# Patient Record
Sex: MALE | Race: WHITE | Employment: FULL TIME | URBAN - METROPOLITAN AREA
[De-identification: names, ages, dates, MRNs, and addresses within clinical notes are randomized per-mention and may not be internally consistent; named-entity substitution may affect disease eponyms.]

---

## 2019-07-09 ENCOUNTER — HOSPITAL ENCOUNTER (EMERGENCY)
Age: 24
Discharge: HOME | End: 2019-07-10
Attending: EMERGENCY MEDICINE

## 2019-07-09 ENCOUNTER — APPOINTMENT (OUTPATIENT)
Dept: CT IMAGING | Age: 24
End: 2019-07-09

## 2019-07-09 ENCOUNTER — APPOINTMENT (OUTPATIENT)
Dept: GENERAL RADIOLOGY | Age: 24
End: 2019-07-09

## 2019-07-09 VITALS
HEART RATE: 91 BPM | TEMPERATURE: 98.8 F | DIASTOLIC BLOOD PRESSURE: 80 MMHG | HEIGHT: 77 IN | BODY MASS INDEX: 22.43 KG/M2 | SYSTOLIC BLOOD PRESSURE: 136 MMHG | RESPIRATION RATE: 14 BRPM | WEIGHT: 190 LBS | OXYGEN SATURATION: 96 %

## 2019-07-09 DIAGNOSIS — S00.03XA CONTUSION OF SCALP, INITIAL ENCOUNTER: Primary | ICD-10-CM

## 2019-07-09 DIAGNOSIS — S80.12XA CONTUSION OF LEFT LOWER EXTREMITY, INITIAL ENCOUNTER: ICD-10-CM

## 2019-07-09 PROCEDURE — 70450 CT HEAD/BRAIN W/O DYE: CPT

## 2019-07-09 PROCEDURE — 73590 X-RAY EXAM OF LOWER LEG: CPT

## 2019-07-09 PROCEDURE — 73562 X-RAY EXAM OF KNEE 3: CPT

## 2019-07-09 PROCEDURE — 99284 EMERGENCY DEPT VISIT MOD MDM: CPT

## 2019-07-09 RX ORDER — IBUPROFEN 800 MG/1
800 TABLET ORAL EVERY 8 HOURS PRN
Qty: 20 TABLET | Refills: 0 | Status: SHIPPED | OUTPATIENT
Start: 2019-07-09 | End: 2019-07-28 | Stop reason: ALTCHOICE

## 2019-07-09 ASSESSMENT — PAIN DESCRIPTION - PAIN TYPE: TYPE: ACUTE PAIN

## 2019-07-09 ASSESSMENT — PAIN SCALES - GENERAL: PAINLEVEL_OUTOF10: 5

## 2019-07-09 ASSESSMENT — PAIN DESCRIPTION - LOCATION: LOCATION: HEAD

## 2019-07-10 NOTE — ED PROVIDER NOTES
Social History     Socioeconomic History    Marital status: Single     Spouse name: None    Number of children: None    Years of education: None    Highest education level: None   Occupational History    None   Social Needs    Financial resource strain: None    Food insecurity:     Worry: None     Inability: None    Transportation needs:     Medical: None     Non-medical: None   Tobacco Use    Smoking status: Current Every Day Smoker     Packs/day: 0.50    Smokeless tobacco: Never Used   Substance and Sexual Activity    Alcohol use: Yes    Drug use: Yes     Types: Marijuana    Sexual activity: None   Lifestyle    Physical activity:     Days per week: None     Minutes per session: None    Stress: None   Relationships    Social connections:     Talks on phone: None     Gets together: None     Attends Cheondoism service: None     Active member of club or organization: None     Attends meetings of clubs or organizations: None     Relationship status: None    Intimate partner violence:     Fear of current or ex partner: None     Emotionally abused: None     Physically abused: None     Forced sexual activity: None   Other Topics Concern    None   Social History Narrative    None       SCREENINGS    [place] Coma Scale  Eye Opening: Spontaneous  Best Verbal Response: Oriented  Best Motor Response: Obeys commands  Rissa Coma Scale Score: 15 @FLOW([id number])@      PHYSICAL EXAM    (up to 7 for level 4, 8 or more for level 5)     ED Triage Vitals [07/09/19 2246]   BP Temp Temp Source Pulse Resp SpO2 Height Weight   (!) 147/95 98.8 °F (37.1 °C) Oral 85 15 98 % 6' 5\" (1.956 m) 190 lb (86.2 kg)       Physical Exam      General Appearance:  Alert, cooperative, no distress, appears stated age. Head:  Normocephalic, without obviousabnormality, atraumatic. Some abrasions to the right side of the head   Eyes:  conjunctiva/corneas clear, EOM's intact. Sclera anicteric.    ENT: Mucous membranes moist.   Neck: Supple, symmetrical, trachea midline, no adenopathy. No jugular venous distention. Lungs:   Clear to auscultation bilaterally, respirationsunlabored. No rales, rhonchi or wheezes. Chest Wall:  No tenderness. Heart:  Regular rate and rhythm, S1 and S2 normal, no murmur, rub or gallop. Abdomen:   Soft, non-tender, bowel sounds active,   no masses, no organomegaly. Extremities: No edema, cords or calf tenderness. Full range of motion. Abrasion to the left leg full range of motion of the knee   Pulses: 2+ and symmetric   Skin: Turgor is normal, no rashes or lesions. Neurologic: Alert and oriented X 3. No focal findings. Motor grossly normal.  Speech clear, no drift, CN III-XII grossly intact,        DIAGNOSTIC RESULTS   LABS:    Labs Reviewed - No data to display    All other labs were within normal range or not returned as of this dictation. EKG: All EKG's are interpreted by the Emergency Department Physician who eithersigns or Co-signs this chart in the absence of a cardiologist.        RADIOLOGY:   Non-plain film images such as CT, Ultrasound and MRI are read by the radiologist. Plain radiographic images are visualized by myself. *    Interpretation per the Radiologist below, if available at the time of this note:    CT Head WO Contrast   Final Result   No acute intracranial abnormality. XR KNEE LEFT (3 VIEWS)   Final Result   No acute plain film abnormality identified. XR TIBIA FIBULA LEFT (2 VIEWS)   Final Result   No acute plain film abnormality identified.                PROCEDURES   Unless otherwise noted below, none     Procedures    *    CRITICAL CARE TIME   N/A      EMERGENCY DEPARTMENT COURSE and DIFFERENTIALDIAGNOSIS/MDM:   Vitals:    Vitals:    07/09/19 2246 07/09/19 2342   BP: (!) 147/95 136/80   Pulse: 85 91   Resp: 15 14   Temp: 98.8 °F (37.1 °C)    TempSrc: Oral    SpO2: 98% 96%   Weight: 190 lb (86.2 kg)    Height: 6' 5\" (1.956 m)        Patient was given

## 2019-07-10 NOTE — ED NOTES
Findings and plan of care discussed with patient at bedside by provider. Pt verbalizes understanding of instructions as discussed by provider. Written discharge instructions, rx x 1 and referral info provided, pt in agreement with plan of care, denies other needs at this time.      Florian Vasques RN  07/10/19 4408

## 2019-07-28 ENCOUNTER — HOSPITAL ENCOUNTER (EMERGENCY)
Age: 24
Discharge: HOME | End: 2019-07-28

## 2019-07-28 VITALS
WEIGHT: 190 LBS | BODY MASS INDEX: 22.43 KG/M2 | OXYGEN SATURATION: 98 % | DIASTOLIC BLOOD PRESSURE: 99 MMHG | HEIGHT: 77 IN | HEART RATE: 63 BPM | TEMPERATURE: 98.3 F | SYSTOLIC BLOOD PRESSURE: 128 MMHG | RESPIRATION RATE: 16 BRPM

## 2019-07-28 DIAGNOSIS — S61.213A LACERATION OF LEFT MIDDLE FINGER WITHOUT DAMAGE TO NAIL, FOREIGN BODY PRESENCE UNSPECIFIED, INITIAL ENCOUNTER: Primary | ICD-10-CM

## 2019-07-28 PROCEDURE — 6360000002 HC RX W HCPCS: Performed by: NURSE PRACTITIONER

## 2019-07-28 PROCEDURE — 99282 EMERGENCY DEPT VISIT SF MDM: CPT

## 2019-07-28 PROCEDURE — 90471 IMMUNIZATION ADMIN: CPT | Performed by: NURSE PRACTITIONER

## 2019-07-28 PROCEDURE — 90715 TDAP VACCINE 7 YRS/> IM: CPT | Performed by: NURSE PRACTITIONER

## 2019-07-28 RX ADMIN — TETANUS TOXOID, REDUCED DIPHTHERIA TOXOID AND ACELLULAR PERTUSSIS VACCINE, ADSORBED 0.5 ML: 5; 2.5; 8; 8; 2.5 SUSPENSION INTRAMUSCULAR at 19:00

## 2019-07-28 ASSESSMENT — ENCOUNTER SYMPTOMS
ABDOMINAL PAIN: 0
SHORTNESS OF BREATH: 0

## 2019-07-28 ASSESSMENT — PAIN DESCRIPTION - ORIENTATION: ORIENTATION: LEFT

## 2019-07-28 ASSESSMENT — PAIN DESCRIPTION - PAIN TYPE: TYPE: ACUTE PAIN

## 2019-07-28 ASSESSMENT — PAIN DESCRIPTION - LOCATION: LOCATION: FINGER (COMMENT WHICH ONE)

## 2019-07-28 ASSESSMENT — PAIN SCALES - GENERAL: PAINLEVEL_OUTOF10: 5

## 2019-07-28 NOTE — ED NOTES
Instructed to leave in place until tomorrow. Remove dsg daily and wash soap and water. Polysporin x 2 days then just cover with dry bandaid. Return for increased redness swelling drainage. Pt v/u. Polysporin, telfa and 2 in cotton roll applied. Tolerated well.       Jana Leigh RN  07/28/19 1922

## 2019-07-28 NOTE — ED PROVIDER NOTES
rate.   Pulmonary/Chest: Effort normal. No respiratory distress. Abdominal: Soft. He exhibits no distension. Musculoskeletal: Normal range of motion. He exhibits tenderness. He exhibits no edema or deformity. 1.5cm Superficial laceration noted to left middle finger at the distal phalanx on the dorsal side. Edges are well approximated bleeding is controlled. Patient has full range of motion is able to bend at the DIP and PIP joints. Neurological: He is alert and oriented to person, place, and time. Skin: Skin is warm and dry. Psychiatric: He has a normal mood and affect. DIAGNOSTIC RESULTS   LABS:    Labs Reviewed - No data to display    All other labs werewithin normal range or not returned as of this dictation. EKG: All EKG's are interpreted by the Emergency Department Physician who either signs or Co-signs this chart in the absence of acardiologist.  Please see their note for interpretation of EKG. RADIOLOGY:           Interpretation per the Radiologist below, if available at the time of this note:    No orders to display     No results found. PROCEDURES   Unless otherwise noted below, none     Procedures    CRITICAL CARE TIME   N/A    CONSULTS:  None      EMERGENCYDEPARTMENT COURSE and DIFFERENTIAL DIAGNOSIS/MDM:   Vitals:    Vitals:    07/28/19 1816   BP: (!) 128/99   Pulse: 63   Resp: 16   Temp: 98.3 °F (36.8 °C)   TempSrc: Oral   SpO2: 98%   Weight: 190 lb (86.2 kg)   Height: 6' 5\" (1.956 m)       Patient was given the following medications:  Medications   Tetanus-Diphth-Acell Pertussis (BOOSTRIX) injection 0.5 mL (has no administration in time range)       Patient was seen and evaluated by myself. Patient here for finger laceration that occurred today with a . On exam the patient is awake and alert hemodynamically stable nontoxic in appearance. He does have a superficial laceration which I do not feel needs any sutures at this time.   The edges are well approximated and bleeding is controlled. He did receive wound care here. He will be updated on his tetanus. He will be discharged home with instructions to follow-up and establish care with the PCP that was provided. He was encouraged to return to the ED for any worsening symptoms. Patient was discharged home with all questions answered. The patient tolerated their visit well. I have evaluated thispatient. My supervising physician was available for consultation. The patient and / or the family were informed of the results of any tests, a time was given to answer questions, a plan was proposed and they agreed Benny Díaz. FINAL IMPRESSION      1. Laceration of left middle finger without damage to nail, foreign body presence unspecified, initial encounter          DISPOSITION/PLAN   DISPOSITION        PATIENT REFERRED TO:  [facility]) Pre-Services  [phone]  Schedule an appointment as soon as possible for a visit in 1 week  to establish primary care    [place] [id number].  [facility] Emergency Department  [address] [place] [address]  [phone]    If symptoms worsen      DISCHARGE MEDICATIONS:  New Prescriptions    No medications on file       DISCONTINUED MEDICATIONS:  Discontinued Medications    IBUPROFEN (IBU) 800 MG TABLET    Take 1 tablet by mouth every 8 hours as needed for Pain              (Please note that portions of this note were completed with a voice recognition program.  Efforts were made to edit the dictations but occasionally words are mis-transcribed.)    MARIANA Prieto CNP (electronically signed)       MARIANA Prieto CNP  07/28/19 1678

## 2019-12-29 ENCOUNTER — HOSPITAL ENCOUNTER (EMERGENCY)
Age: 24
Discharge: HOME | End: 2019-12-29
Attending: EMERGENCY MEDICINE

## 2019-12-29 ENCOUNTER — APPOINTMENT (OUTPATIENT)
Dept: CT IMAGING | Age: 24
End: 2019-12-29

## 2019-12-29 ENCOUNTER — APPOINTMENT (OUTPATIENT)
Dept: GENERAL RADIOLOGY | Age: 24
End: 2019-12-29

## 2019-12-29 VITALS
DIASTOLIC BLOOD PRESSURE: 70 MMHG | OXYGEN SATURATION: 99 % | WEIGHT: 190 LBS | SYSTOLIC BLOOD PRESSURE: 124 MMHG | BODY MASS INDEX: 22.43 KG/M2 | TEMPERATURE: 98 F | HEART RATE: 64 BPM | RESPIRATION RATE: 16 BRPM | HEIGHT: 77 IN

## 2019-12-29 PROCEDURE — 99285 EMERGENCY DEPT VISIT HI MDM: CPT

## 2019-12-29 PROCEDURE — 71046 X-RAY EXAM CHEST 2 VIEWS: CPT

## 2019-12-29 PROCEDURE — 70450 CT HEAD/BRAIN W/O DYE: CPT

## 2019-12-29 ASSESSMENT — PAIN DESCRIPTION - DIRECTION: RADIATING_TOWARDS: INTO BACK

## 2019-12-29 ASSESSMENT — PAIN DESCRIPTION - DURATION: DURATION_2: INTERMITTENT

## 2019-12-29 ASSESSMENT — PAIN DESCRIPTION - DESCRIPTORS: DESCRIPTORS_2: PRESSURE

## 2019-12-29 ASSESSMENT — ENCOUNTER SYMPTOMS
BACK PAIN: 0
ABDOMINAL PAIN: 0
SHORTNESS OF BREATH: 0

## 2019-12-29 ASSESSMENT — PAIN DESCRIPTION - INTENSITY: RATING_2: 0

## 2019-12-29 ASSESSMENT — PAIN SCALES - GENERAL: PAINLEVEL_OUTOF10: 2

## 2019-12-29 ASSESSMENT — PAIN DESCRIPTION - ORIENTATION
ORIENTATION: LEFT
ORIENTATION_2: ANTERIOR;RIGHT

## 2019-12-29 ASSESSMENT — PAIN DESCRIPTION - LOCATION
LOCATION: CHEST
LOCATION_2: HEAD

## 2019-12-29 ASSESSMENT — VISUAL ACUITY
OU: 20/200
OD: 20/200
OS: 20/200

## 2019-12-29 ASSESSMENT — PAIN DESCRIPTION - PROGRESSION
CLINICAL_PROGRESSION: NOT CHANGED
CLINICAL_PROGRESSION_2: RAPIDLY IMPROVING

## 2019-12-29 ASSESSMENT — PAIN - FUNCTIONAL ASSESSMENT: PAIN_FUNCTIONAL_ASSESSMENT: ACTIVITIES ARE NOT PREVENTED

## 2019-12-29 ASSESSMENT — PAIN DESCRIPTION - PAIN TYPE: TYPE: ACUTE PAIN

## 2019-12-29 ASSESSMENT — PAIN DESCRIPTION - ONSET
ONSET_2: SUDDEN
ONSET: SUDDEN

## 2019-12-29 ASSESSMENT — PAIN DESCRIPTION - FREQUENCY: FREQUENCY: CONTINUOUS

## 2019-12-29 NOTE — ED NOTES
The patient is sitting up in the bed using cell phone without any difficulty noted in being able to see/use his cell phone.       Milan Farah RN  12/29/19 9309

## 2019-12-29 NOTE — ED PROVIDER NOTES
neck pain. Neurological: Positive for headaches. Negative for dizziness. Psychiatric/Behavioral: Negative for behavioral problems. All other systems reviewed and are negative. Except as noted above the remainder of the review of systems was reviewed and negative. PAST MEDICAL HISTORY   History reviewed. No pertinent past medical history. SURGICAL HISTORY     History reviewed. No pertinent surgical history. CURRENT MEDICATIONS       Previous Medications    No medications on file       ALLERGIES     Patient has no known allergies. FAMILY HISTORY     History reviewed. No pertinent family history.        SOCIAL HISTORY       Social History     Socioeconomic History    Marital status: Single     Spouse name: None    Number of children: None    Years of education: None    Highest education level: None   Occupational History    None   Social Needs    Financial resource strain: None    Food insecurity:     Worry: None     Inability: None    Transportation needs:     Medical: None     Non-medical: None   Tobacco Use    Smoking status: Current Every Day Smoker     Packs/day: 0.50    Smokeless tobacco: Current User     Types: Chew   Substance and Sexual Activity    Alcohol use: Yes     Comment: occ    Drug use: Yes     Types: Marijuana    Sexual activity: Yes     Partners: Female   Lifestyle    Physical activity:     Days per week: None     Minutes per session: None    Stress: None   Relationships    Social connections:     Talks on phone: None     Gets together: None     Attends Alevism service: None     Active member of club or organization: None     Attends meetings of clubs or organizations: None     Relationship status: None    Intimate partner violence:     Fear of current or ex partner: None     Emotionally abused: None     Physically abused: None     Forced sexual activity: None   Other Topics Concern    None   Social History Narrative    None       SCREENINGS Rissa Coma Scale  Eye Opening: Spontaneous  Best Verbal Response: Oriented          PHYSICAL EXAM    (up to 7 for level 4, 8 or more for level 5)     ED Triage Vitals [12/29/19 0949]   BP Temp Temp Source Pulse Resp SpO2 Height Weight   130/85 98 °F (36.7 °C) Oral 60 17 98 % 6' 5\" (1.956 m) 190 lb (86.2 kg)       Physical Exam     Eyes pupils PERRLA extraocular muscles intact  Cranial nerves II through XII intact  No lymphadenopathy in the cervical or supraclavicular area  No nuchal rigidity or meningismus  Visual fields are grossly normal  He is awake alert oriented not toxic or septic looking or in distress  Cardiovascular normal S1-S2 without murmur gallop rub or click. Abdomen is soft no bruit large liver spleen no paraspinal megaly  Skin no rashes no petechiae purpura  No other abnormalities noted. Cranial nerves II through XII intact motor sensory exam is normal  She has no petechia purpura  No nuchal rigidity  No lethargy  Gait is normal  Finger-to-nose performed adequately  No nystagmus      DIAGNOSTIC RESULTS     EKG: All EKG's are interpreted by the Emergency Department Physician who either signs or Co-signs this chart in the absence of a cardiologist.        RADIOLOGY:   Non-plain film images such as CT, Ultrasound and MRI are read by the radiologist. Plain radiographic images are visualized and preliminarily interpreted by the emergency physician with the below findings:        Interpretation per the Radiologist below, if available at the time of this note:    CT Head WO Contrast   Final Result   No acute intracranial abnormality. XR CHEST STANDARD (2 VW)   Final Result   Normal chest radiographs. LABS:  No results found for this visit on 12/29/19.          EMERGENCY DEPARTMENT COURSE and DIFFERENTIAL DIAGNOSIS/MDM:     Vitals:    12/29/19 0949   BP: 130/85   Pulse: 60   Resp: 17   Temp: 98 °F (36.7 °C)   TempSrc: Oral   SpO2: 98%   Weight: 190 lb (86.2 kg)   Height: 6' 5\" (1.956 m)           MDM      REASSESSMENT          CRITICAL CARE TIME     CONSULTS:  None      PROCEDURES:     Procedures    MEDICATIONS GIVEN THIS VISIT:  Medications - No data to display     FINAL IMPRESSION      1. Other headache syndrome            DISPOSITION/PLAN   DISPOSITION Decision To Discharge 12/29/2019 12:42:14 PM      PATIENT REFERRED TO:  [facility]) Pre-Services  [phone]  Schedule an appointment as soon as possible for a visit         DISCHARGE MEDICATIONS:  New Prescriptions    No medications on file       Controlled Substances Monitoring  No flowsheet data found. (Please note that portions of this note were completed with a voice recognition program.  Efforts were made to edit the dictations but occasionally words are mis-transcribed.)    Patient was advised to return to the Emergency Department if there was any worsening.     Kelley Foote MD (electronically signed)  Attending Emergency Physician         Edward Crenshaw MD  12/29/19 8209

## 2019-12-29 NOTE — LETTER
[address] [facility] Emergency Department  [facility] [address]  Phone: [phone]               December 29, 2019    Patient: Dory Parker   YOB: 1995   Date of Visit: 12/29/2019       To Whom It May Concern:    Dory Parker was seen and treated in our emergency department on 12/29/2019. He may return work.       Sincerely,             Signature:__________________________________

## 2019-12-29 NOTE — ED NOTES
AVS provided and reviewed with the patient. The patient verbalized understanding of care at home, follow up care, pain managment and emergent symptoms to return for. No questions or concerns verbalized at this time. The patient is alert, oriented, stable, and ambulatory out of the department at the time of discharge. Discharged with work note.      Debby Holder RN  12/29/19 8721

## 2020-03-23 ENCOUNTER — HOSPITAL ENCOUNTER (EMERGENCY)
Age: 25
Discharge: HOME | End: 2020-03-23

## 2020-03-23 ENCOUNTER — APPOINTMENT (OUTPATIENT)
Dept: GENERAL RADIOLOGY | Age: 25
End: 2020-03-23

## 2020-03-23 VITALS
SYSTOLIC BLOOD PRESSURE: 146 MMHG | DIASTOLIC BLOOD PRESSURE: 79 MMHG | WEIGHT: 200 LBS | HEART RATE: 87 BPM | OXYGEN SATURATION: 100 % | TEMPERATURE: 98.2 F | BODY MASS INDEX: 23.62 KG/M2 | HEIGHT: 77 IN | RESPIRATION RATE: 16 BRPM

## 2020-03-23 LAB
RAPID INFLUENZA  B AGN: NEGATIVE
RAPID INFLUENZA A AGN: NEGATIVE
S PYO AG THROAT QL: NEGATIVE

## 2020-03-23 PROCEDURE — 71045 X-RAY EXAM CHEST 1 VIEW: CPT

## 2020-03-23 PROCEDURE — 87804 INFLUENZA ASSAY W/OPTIC: CPT

## 2020-03-23 PROCEDURE — 99283 EMERGENCY DEPT VISIT LOW MDM: CPT

## 2020-03-23 PROCEDURE — 87880 STREP A ASSAY W/OPTIC: CPT

## 2020-03-23 PROCEDURE — 87081 CULTURE SCREEN ONLY: CPT

## 2020-03-23 RX ORDER — BENZONATATE 100 MG/1
100 CAPSULE ORAL 3 TIMES DAILY PRN
Qty: 15 CAPSULE | Refills: 0 | Status: SHIPPED | OUTPATIENT
Start: 2020-03-23 | End: 2020-03-30

## 2020-03-23 ASSESSMENT — ENCOUNTER SYMPTOMS
CHEST TIGHTNESS: 1
RHINORRHEA: 1
SORE THROAT: 1
COUGH: 1
VOMITING: 0

## 2020-03-23 NOTE — ED PROVIDER NOTES
[address] [facility]        Pt Name: Mariaelena Rogel  MRN: [id number]  [place] 1995  Date of evaluation: 3/23/2020  Provider: Evelyn Patel PA-C  PCP: No primary care provider on file. Shared Visit or Autonomous Visit: Evaluation by TINA. My supervising physician was available for consultation. CHIEF COMPLAINT       Chief Complaint   Patient presents with    Cough     pt c/o cough, but states he is smoker and coughs on a regular basis, denies sore throat, states he went to work at [employer] and they told him he \"felt hot\" and wanted him to go to the ER       HISTORY OF PRESENT ILLNESS   (Location/Symptom, Timing/Onset, Context/Setting, Quality, Duration, Modifying Factors, Severity)  Note limiting factors. Mariaelena Rogel is a 23 y.o. male presenting to the ER with complaint of cough, runny nose, sneezing, sore throat, headaches for the past 2 to 3 days. States he is a smoker and he always has a smoker's cough but may be a little worse the past few days since he has not felt well. States sometimes he feels a little tight in his chest states he has always gotten that off and on since he smoked. States smokes about 1-1/2 packs/week. No known chronic lung disease. States he called his work this morning was going to call off work because he did not feel well but nobody answered so he went into work and when he told them he did not feel well coworker put their forearm against his forehead and told him he felt warm and to go to the ER. States he checked his temperature at home was 96.7. He has not had any known fevers. No known ill exposures. No travel outside the country. No known exposures to anyone with Covid-19. The history is provided by the patient.    URI   Presenting symptoms: cough, ear pain, rhinorrhea and sore throat    Presenting symptoms: no fever    Onset quality:  Gradual  Duration:  3 days  Chronicity:  New  Risk factors: no chronic respiratory disease, no immunosuppression, no recent travel and no sick contacts        Nursing Notes were reviewed    REVIEW OF SYSTEMS    (2-9 systems for level 4, 10 or more for level 5)     Review of Systems   Constitutional: Negative for fever. HENT: Positive for ear pain, rhinorrhea and sore throat. Respiratory: Positive for cough and chest tightness. Gastrointestinal: Negative for vomiting. Positives and Pertinent negatives as per HPI. PAST MEDICAL HISTORY   History reviewed. No pertinent past medical history. SURGICAL HISTORY   History reviewed. No pertinent surgical history. CURRENTMEDICATIONS       Previous Medications    No medications on file         ALLERGIES     Patient has no known allergies. FAMILYHISTORY     History reviewed. No pertinent family history.        SOCIAL HISTORY       Social History     Socioeconomic History    Marital status: Single     Spouse name: None    Number of children: None    Years of education: None    Highest education level: None   Occupational History    None   Social Needs    Financial resource strain: None    Food insecurity     Worry: None     Inability: None    Transportation needs     Medical: None     Non-medical: None   Tobacco Use    Smoking status: Current Every Day Smoker     Packs/day: 0.50    Smokeless tobacco: Current User     Types: Chew   Substance and Sexual Activity    Alcohol use: Yes     Comment: occ    Drug use: Yes     Types: Marijuana    Sexual activity: Yes     Partners: Female   Lifestyle    Physical activity     Days per week: None     Minutes per session: None    Stress: None   Relationships    Social connections     Talks on phone: None     Gets together: None     Attends Rastafarian service: None     Active member of club or organization: None     Attends meetings of clubs or organizations: None     Relationship status: None    Intimate partner Performed at:  [facility]  [address] [place], [place]. [place], [address]   Phone  A THROAT    Narrative:     Performed at:  [facility]  [address] [place], [place]. [place], [address]   Phone [phone]   CULTURE, BETA STREP CONFIRM PLATES     Results for orders placed or performed during the hospital encounter of 03/23/20   Rapid influenza A/B antigens   Result Value Ref Range    Rapid Influenza A Ag Negative Negative    Rapid Influenza B Ag Negative Negative   Strep screen group a throat   Result Value Ref Range    Rapid Strep A Screen Negative Negative         All other labs were within normal range or not returned as of this dictation. EKG: All EKG's are interpreted by the Emergency Department Physician in the absence of a cardiologist.  Please see their note for interpretation of EKG. RADIOLOGY:   Non-plain film images such as CT, Ultrasound and MRI are read by the radiologist. Plain radiographic images are visualized andpreliminarily interpreted by the  ED Provider with the below findings:        Interpretation perthe Radiologist below, if available at the time of this note:    XR CHEST PORTABLE   Final Result   Low lung volume study without acute process. Xr Chest Portable    Result Date: 3/23/2020  EXAMINATION: ONE XRAY VIEW OF THE CHEST 3/23/2020 1:43 pm COMPARISON: Chest radiograph December 29, 2019. HISTORY: ORDERING SYSTEM PROVIDED HISTORY: cough r/o pneumonia TECHNOLOGIST PROVIDED HISTORY: Reason for exam:->cough r/o pneumonia Reason for Exam: cough,smoker Acuity: Acute Type of Exam: Initial FINDINGS: The lung volumes are low. The lungs are without acute focal process. There is no effusion or pneumothorax. The cardiomediastinal silhouette is without acute process. The osseous structures are without acute process. Low lung volume study without acute process. PROCEDURES   Unless otherwise noted below, none     Procedures    CRITICAL CARE TIME   N/A    CONSULTS:  None      EMERGENCY DEPARTMENT COURSE and DIFFERENTIAL DIAGNOSIS/MDM:   Vitals:    Vitals:    03/23/20 1320   BP: (!) 146/79   Pulse: 87   Resp: 16   Temp: 98.2 °F (36.8 °C)   TempSrc: Oral   SpO2: 100%   Weight: 200 lb (90.7 kg)   Height: 6' 5\" (1.956 m)       Patient was given thefollowing medications:  Medications - No data to display    1:58 PM EDT  Flu and strep screens are negative. Chest x-ray is negative no pneumonia seen. Suspect viral upper respiratory infection. Symptomatic treatment Tylenol Motrin as needed rest and plenty of fluids Tessalon as needed for cough. We are currently in Covid-19 pandemic I have included information about this for patient and discharge instructions. We have discussed testing is very limited at this time and I am unable to test him for this however with his symptoms today I feel this is less likely without fever but cannot guarantee he understands. He understands he should not go to work if he has any fever. Referral to primary care for follow-up. Advise returning to the ER for any worsening or change in symptoms. He understands and agrees. I estimate there is LOW risk for RESPIRATORY FAILURE, PNEUMONIA, MENINGITIS, PERITONSILLAR ABSCESS, SEPSIS, MALIGNANT OTITIS EXTERNA, OR EPIGLOTTITIS thus I consider the discharge disposition reasonable. FINAL IMPRESSION      1. Acute upper respiratory infection    2. Cough    3. Elevated blood pressure reading    4. Viral URI with cough          DISPOSITION/PLAN   DISPOSITION     PATIENT REFERREDTO:  [facility]) Pre-Services  [phone]  In 1 week  primary care referral    [place] [id number].  [facility] Emergency Department  [address] [place] [address]  [phone]    If symptoms worsen      DISCHARGE MEDICATIONS:  New Prescriptions    BENZONATATE (TESSALON PERLES) 100 MG CAPSULE    Take 1 capsule by mouth 3 times daily as needed for Cough       DISCONTINUED MEDICATIONS:  Discontinued Medications    No medications on file              (Please note that portions ofthis note were completed with a voice recognition program.  Efforts were made to edit the dictations but occasionally words are mis-transcribed.)    Ronel Jeffers PA-C (electronically signed)           Farzaneh Collins PA-C  03/23/20 6284

## 2020-03-25 LAB — S PYO THROAT QL CULT: NORMAL

## 2020-05-30 ENCOUNTER — HOSPITAL ENCOUNTER (EMERGENCY)
Age: 25
Discharge: HOME | End: 2020-05-30
Attending: EMERGENCY MEDICINE

## 2020-05-30 ENCOUNTER — APPOINTMENT (OUTPATIENT)
Dept: GENERAL RADIOLOGY | Age: 25
End: 2020-05-30

## 2020-05-30 VITALS
DIASTOLIC BLOOD PRESSURE: 71 MMHG | OXYGEN SATURATION: 99 % | WEIGHT: 220 LBS | HEIGHT: 77 IN | HEART RATE: 71 BPM | SYSTOLIC BLOOD PRESSURE: 120 MMHG | BODY MASS INDEX: 25.98 KG/M2 | TEMPERATURE: 97.2 F | RESPIRATION RATE: 20 BRPM

## 2020-05-30 PROCEDURE — 73630 X-RAY EXAM OF FOOT: CPT

## 2020-05-30 PROCEDURE — 99283 EMERGENCY DEPT VISIT LOW MDM: CPT

## 2020-05-30 RX ORDER — CLOTRIMAZOLE 1 %
CREAM (GRAM) TOPICAL
Qty: 1 TUBE | Refills: 0 | Status: SHIPPED | OUTPATIENT
Start: 2020-05-30 | End: 2020-06-05

## 2020-05-30 ASSESSMENT — PAIN DESCRIPTION - PAIN TYPE
TYPE: ACUTE PAIN
TYPE: ACUTE PAIN

## 2020-05-30 ASSESSMENT — PAIN DESCRIPTION - DESCRIPTORS
DESCRIPTORS: ACHING
DESCRIPTORS: BURNING;DISCOMFORT

## 2020-05-30 ASSESSMENT — PAIN DESCRIPTION - FREQUENCY
FREQUENCY: INTERMITTENT
FREQUENCY: INTERMITTENT

## 2020-05-30 ASSESSMENT — PAIN SCALES - GENERAL
PAINLEVEL_OUTOF10: 2
PAINLEVEL_OUTOF10: 3

## 2020-05-30 ASSESSMENT — PAIN DESCRIPTION - ORIENTATION
ORIENTATION: RIGHT;LEFT
ORIENTATION: RIGHT;LEFT

## 2020-05-30 ASSESSMENT — PAIN DESCRIPTION - LOCATION
LOCATION: FOOT
LOCATION: FOOT

## 2020-05-30 ASSESSMENT — PAIN - FUNCTIONAL ASSESSMENT: PAIN_FUNCTIONAL_ASSESSMENT: 0-10

## 2020-05-30 NOTE — ED PROVIDER NOTES
[facility] EMERGENCY DEPARTMENT    CHIEF COMPLAINT  Rash (bilateral feet  pt  states feet has been painful a xcouple days, pt states he also has a rash on chest? )       HISTORY OF PRESENT ILLNESS  Noelle Eugene is a 23 y.o. male who presents to the ED with rash of bilateral feet. Symptoms started several days ago. Patient states he does not change his socks frequently. Rash is pruritic. Also complains of swelling at medial aspect of right foot. Also complains of pruritic rash of anterior torso. Denies fever, chills, chest pain, shortness of breath, nausea, vomiting, diarrhea. No other complaints, modifying factors or associated symptoms. I have reviewed the following from the nursing documentation:    History reviewed. No pertinent past medical history. History reviewed. No pertinent surgical history. History reviewed. No pertinent family history.   Social History     Socioeconomic History    Marital status: Single     Spouse name: Not on file    Number of children: Not on file    Years of education: Not on file    Highest education level: Not on file   Occupational History    Not on file   Social Needs    Financial resource strain: Not on file    Food insecurity     Worry: Not on file     Inability: Not on file    Transportation needs     Medical: Not on file     Non-medical: Not on file   Tobacco Use    Smoking status: Current Every Day Smoker     Packs/day: 0.50    Smokeless tobacco: Current User     Types: Chew   Substance and Sexual Activity    Alcohol use: Yes     Comment: occ    Drug use: Yes     Types: Marijuana     Comment: occ    Sexual activity: Yes     Partners: Female   Lifestyle    Physical activity     Days per week: Not on file     Minutes per session: Not on file    Stress: Not on file   Relationships    Social connections     Talks on phone: Not on file     Gets together: Not on file     Attends Mu-[employer] service: Not on file     Active member of club or plan.     PROCEDURES  None    CRITICAL CARE  N/A    CLINICAL IMPRESSION  1. Dermatitis of both feet        DISPOSITION   discharge    Condition: stable    Doug Ortiz MD    Note: This chart was created using voice recognition dictation software. Efforts were made by me to ensure accuracy, however some errors may be present due to limitations of this technology and occasionally words are not transcribed correctly.        Doug Ortiz MD  05/30/20 [id number]